# Patient Record
Sex: MALE | Race: OTHER | HISPANIC OR LATINO | URBAN - METROPOLITAN AREA
[De-identification: names, ages, dates, MRNs, and addresses within clinical notes are randomized per-mention and may not be internally consistent; named-entity substitution may affect disease eponyms.]

---

## 2017-08-20 ENCOUNTER — EMERGENCY (EMERGENCY)
Facility: HOSPITAL | Age: 22
LOS: 0 days | Discharge: HOME | End: 2017-08-20

## 2017-08-20 DIAGNOSIS — R50.9 FEVER, UNSPECIFIED: ICD-10-CM

## 2017-08-20 DIAGNOSIS — J02.9 ACUTE PHARYNGITIS, UNSPECIFIED: ICD-10-CM

## 2017-08-20 DIAGNOSIS — Z90.49 ACQUIRED ABSENCE OF OTHER SPECIFIED PARTS OF DIGESTIVE TRACT: ICD-10-CM

## 2017-09-29 ENCOUNTER — EMERGENCY (EMERGENCY)
Facility: HOSPITAL | Age: 22
LOS: 0 days | Discharge: HOME | End: 2017-09-29

## 2017-09-29 ENCOUNTER — EMERGENCY (EMERGENCY)
Facility: HOSPITAL | Age: 22
LOS: 0 days | Discharge: HOME | End: 2017-09-30

## 2017-09-29 DIAGNOSIS — Z90.49 ACQUIRED ABSENCE OF OTHER SPECIFIED PARTS OF DIGESTIVE TRACT: ICD-10-CM

## 2017-09-29 DIAGNOSIS — J02.9 ACUTE PHARYNGITIS, UNSPECIFIED: ICD-10-CM

## 2017-09-29 DIAGNOSIS — R50.9 FEVER, UNSPECIFIED: ICD-10-CM

## 2017-09-29 DIAGNOSIS — M79.1 MYALGIA: ICD-10-CM

## 2017-09-29 DIAGNOSIS — B34.9 VIRAL INFECTION, UNSPECIFIED: ICD-10-CM

## 2018-09-05 ENCOUNTER — EMERGENCY (EMERGENCY)
Facility: HOSPITAL | Age: 23
LOS: 1 days | Discharge: HOME | End: 2018-09-05
Attending: EMERGENCY MEDICINE | Admitting: EMERGENCY MEDICINE

## 2018-09-05 VITALS
SYSTOLIC BLOOD PRESSURE: 126 MMHG | RESPIRATION RATE: 20 BRPM | OXYGEN SATURATION: 99 % | TEMPERATURE: 103 F | WEIGHT: 184.97 LBS | DIASTOLIC BLOOD PRESSURE: 61 MMHG | HEIGHT: 70 IN | HEART RATE: 102 BPM

## 2018-09-05 VITALS
RESPIRATION RATE: 20 BRPM | SYSTOLIC BLOOD PRESSURE: 130 MMHG | HEART RATE: 67 BPM | DIASTOLIC BLOOD PRESSURE: 66 MMHG | TEMPERATURE: 98 F

## 2018-09-05 DIAGNOSIS — M79.1 MYALGIA: ICD-10-CM

## 2018-09-05 DIAGNOSIS — R50.9 FEVER, UNSPECIFIED: ICD-10-CM

## 2018-09-05 DIAGNOSIS — J02.9 ACUTE PHARYNGITIS, UNSPECIFIED: ICD-10-CM

## 2018-09-05 DIAGNOSIS — R59.0 LOCALIZED ENLARGED LYMPH NODES: ICD-10-CM

## 2018-09-05 RX ORDER — SODIUM CHLORIDE 9 MG/ML
1000 INJECTION INTRAMUSCULAR; INTRAVENOUS; SUBCUTANEOUS ONCE
Qty: 0 | Refills: 0 | Status: COMPLETED | OUTPATIENT
Start: 2018-09-05 | End: 2018-09-05

## 2018-09-05 RX ORDER — KETOROLAC TROMETHAMINE 30 MG/ML
15 SYRINGE (ML) INJECTION ONCE
Qty: 0 | Refills: 0 | Status: DISCONTINUED | OUTPATIENT
Start: 2018-09-05 | End: 2018-09-05

## 2018-09-05 RX ORDER — DEXAMETHASONE 0.5 MG/5ML
10 ELIXIR ORAL ONCE
Qty: 0 | Refills: 0 | Status: COMPLETED | OUTPATIENT
Start: 2018-09-05 | End: 2018-09-05

## 2018-09-05 RX ADMIN — Medication 102 MILLIGRAM(S): at 04:22

## 2018-09-05 RX ADMIN — SODIUM CHLORIDE 1000 MILLILITER(S): 9 INJECTION INTRAMUSCULAR; INTRAVENOUS; SUBCUTANEOUS at 04:22

## 2018-09-05 RX ADMIN — Medication 10 MILLIGRAM(S): at 04:22

## 2018-09-05 RX ADMIN — Medication 15 MILLIGRAM(S): at 04:22

## 2018-09-05 NOTE — ED ADULT NURSE NOTE - NSIMPLEMENTINTERV_GEN_ALL_ED
Implemented All Universal Safety Interventions:  [place] to call system. Call bell, personal items and telephone within reach. Instruct patient to call for assistance. Room bathroom lighting operational. Non-slip footwear when patient is off stretcher. Physically safe environment: no spills, clutter or unnecessary equipment. Stretcher in lowest position, wheels locked, appropriate side rails in place.

## 2018-09-05 NOTE — ED PROVIDER NOTE - MEDICAL DECISION MAKING DETAILS
24 yo male with fever and sore throat, already on abx by his ENT doctor.  Tolerating PO, feeling much better, d/c home in a stable condition.

## 2018-09-05 NOTE — ED PROVIDER NOTE - NS ED ROS FT
Review of Systems    Constitutional: (+) fever  Cardiovascular: (-) chest pain, (-) syncope  Respiratory: (-) cough, (-) shortness of breath  Gastrointestinal: (-) vomiting, (-) diarrhea  Integumentary: (-) rash, (-) edema  Neurological: (-) headache

## 2018-09-05 NOTE — ED ADULT NURSE NOTE - OBJECTIVE STATEMENT
Pt c/o fever and swelling of left side of neck. Pt states has frequent swelling and saw specialist. No s/s of distress, airway intact. Denies n/v/d.

## 2018-09-05 NOTE — ED PROVIDER NOTE - OBJECTIVE STATEMENT
22 y/o M with PMH chronic throat infection, saw ENT recently and told needs "glands" removed to prevent recurrence as he has had 1 episodes in last year of same symptoms: sore throat, fever, swollen lymph nodes. He relates this episode is x yesterday. +body aches. on amoxicillin. Denies CP, palpitations, SOB, back pain, abdominal pain, n/v/d, , trauma, fall, cough, recent travel,  sick contacts, leg pain/swelling, urinary symptoms, rash.

## 2018-09-05 NOTE — ED PROVIDER NOTE - CARE PLAN
Principal Discharge DX:	Lymphadenopathy  Secondary Diagnosis:	Fever  Secondary Diagnosis:	Pharyngitis

## 2018-09-05 NOTE — ED PROVIDER NOTE - PROGRESS NOTE DETAILS
counseled to continue amox and follow with ENT. Counseled on red flags and to return for them. Counseled on importance of follow up. Patient repeats back instructions  Patient appears well on discharge. Appears well, states he is feeling much better, feels like swelling has gone down.  instructed to follow up with his ENT doctor, strict return precautions given,  patient and his mom verbalized understanding and are amenable with the plan.

## 2018-09-05 NOTE — ED PROVIDER NOTE - ATTENDING CONTRIBUTION TO CARE
24 yo male with fever and sore throat since yeesterday similar to multiple prior episodes ( 11th time this year).  Started taking abx prescribed by his ENT doctor, took a dose of Tylenol a few hours PTA. Denies any voice changes, difficulties swallowing,, earache, cough,. SOB , abdominal pain,  Well-appearing, well-nourished young male, NAD, PERRL, mmm, nml phonation, + cervical YEIMI, mild pharyngeal erythema, supple neck, rest of exam WNL.  Will give a dose of antipyretics, decadron, IVF, reassess, anticipate d/c home with ENT f/u ( patient already had a discussion with his doctor regarding surgery, will likely get it scheduled for October).

## 2018-09-05 NOTE — ED PROVIDER NOTE - PHYSICAL EXAMINATION
PHYSICAL EXAM:    GENERAL: Alert, appears stated age, well appearing, non-toxic  SKIN: Warm, pink and dry. MMM.   EYE: Normal lids/conjunctiva  ENT: Normal hearing, patent oropharynx +erythema, without exudate. no uvular deviation. +L anterior cervical swelling with TTP, no skin changes. bedside sono confirms that this is lymph node and not abscess.   NECK: +supple. No meningismus  Pulm: Bilateral BS, normal resp effort, no wheezes, stridor, or retractions  CV: RRR, no M/R/G, 2+ pulses  Abd: soft, non-tender, non-distended, no hepatosplenomegaly. no CVA tenderness.   Mskel: no erythema, cyanosis, edema  Neuro: AAOx3. normal gait.

## 2018-09-21 ENCOUNTER — EMERGENCY (EMERGENCY)
Facility: HOSPITAL | Age: 23
LOS: 0 days | Discharge: HOME | End: 2018-09-21
Admitting: EMERGENCY MEDICAL TECHNICIAN, BASIC

## 2018-09-21 VITALS
RESPIRATION RATE: 18 BRPM | HEART RATE: 72 BPM | OXYGEN SATURATION: 100 % | TEMPERATURE: 99 F | SYSTOLIC BLOOD PRESSURE: 128 MMHG | DIASTOLIC BLOOD PRESSURE: 70 MMHG

## 2018-09-21 VITALS
TEMPERATURE: 101 F | HEART RATE: 82 BPM | DIASTOLIC BLOOD PRESSURE: 65 MMHG | SYSTOLIC BLOOD PRESSURE: 131 MMHG | OXYGEN SATURATION: 100 % | RESPIRATION RATE: 18 BRPM

## 2018-09-21 DIAGNOSIS — R07.0 PAIN IN THROAT: ICD-10-CM

## 2018-09-21 DIAGNOSIS — J02.9 ACUTE PHARYNGITIS, UNSPECIFIED: ICD-10-CM

## 2018-09-21 RX ORDER — DEXAMETHASONE 0.5 MG/5ML
10 ELIXIR ORAL ONCE
Qty: 0 | Refills: 0 | Status: COMPLETED | OUTPATIENT
Start: 2018-09-21 | End: 2018-09-21

## 2018-09-21 RX ORDER — IBUPROFEN 200 MG
800 TABLET ORAL ONCE
Qty: 0 | Refills: 0 | Status: DISCONTINUED | OUTPATIENT
Start: 2018-09-21 | End: 2018-09-21

## 2018-09-21 RX ORDER — ACETAMINOPHEN 500 MG
650 TABLET ORAL ONCE
Qty: 0 | Refills: 0 | Status: COMPLETED | OUTPATIENT
Start: 2018-09-21 | End: 2018-09-21

## 2018-09-21 RX ADMIN — Medication 650 MILLIGRAM(S): at 21:02

## 2018-09-21 RX ADMIN — Medication 10 MILLIGRAM(S): at 22:02

## 2018-09-21 NOTE — ED PROVIDER NOTE - OBJECTIVE STATEMENT
22 yo male, no significant PMH, presents to the ED for evaluation of sore throat that started today. Patient explains that for the passed 15 months he has had on and off URIs, most recent was one week prior to today's visit; patient finished amoxicillin course 3 days ago. Patient also states that he is following an ENT who recently performed an endoscopy finding abnormalities of the patient's tonsils, and has scheduled patient for tonsillectomy in two months. Motrin was taken PTA, which had minimal relief of patient's symptoms. Additionally, patient reports fever, chills, and malaise; denies HA, vision changes, ear pain, neck pain, cough, SOB, dyspnea, chest pain, N/V/D, abdominal pain, dysuria, dizziness, numbness, and tingling.

## 2018-09-21 NOTE — ED ADULT NURSE NOTE - OBJECTIVE STATEMENT
23 Y M presented with throat pain. respirations easy and unlabored no swelling or difficulty swallowing. Pt is speaking clean and full sentences with no sob

## 2018-09-21 NOTE — ED PROVIDER NOTE - PHYSICAL EXAMINATION
Physical Exam    Vital Signs: I have reviewed the initial vital signs.  Constitutional: well-nourished, appears stated age, no acute distress  Eyes: Conjunctiva pink, Sclera clear, PERRLA, EOMI.  ENT: OP erythematous with exudates on left tonsil, MMMs, normal dentition, normal gingival, normal tongue, TMs clear b/l, b/l turbinates without erythema, positive b/l tonsilar lymphadenopathy.  Cardiovascular: S1 and S2, regular rate, regular rhythm, well-perfused extremities, radial pulses equal and 2+  Respiratory: unlabored respiratory effort, clear to auscultation bilaterally no wheezing, rales and rhonchi  Gastrointestinal: soft, non-tender abdomen, no pulsatile mass, normal bowl sounds  Musculoskeletal: supple neck, no lower extremity edema, no midline tenderness.  Integumentary: warm, dry, no rash  Neurologic: awake, alert, cranial nerves II-XII grossly intact, extremities’ motor and sensory functions grossly intact

## 2018-09-21 NOTE — ED PROVIDER NOTE - NS ED ROS FT
Constitutional: (+) fever, (+) chills,   Eyes/ENT: (-) blurry vision, (-) epistaxis, (+) sore throat, (-) ear pain  Cardiovascular: (-) chest pain, (-) syncope  Respiratory: (-) cough, (-) shortness of breath  Gastrointestinal: (-) vomiting, (-) diarrhea, (-) nausea   Musculoskeletal: (-) neck pain, (-) back pain, (-) joint pain  Integumentary: (-) rash, (-) edema  Neurological: (-) headache, (-) altered mental status, (-) dizziness, (-) tingling, (-) numbness

## 2018-09-21 NOTE — ED ADULT NURSE NOTE - NSIMPLEMENTINTERV_GEN_ALL_ED
Implemented All Universal Safety Interventions:  [place] to call system. Call bell, personal items and telephone within reach. Instruct patient to call for assistance. Room bathroom lighting operational. Non-slip footwear when patient is off stretcher. Physically safe environment: no spills, clutter or unnecessary equipment. Stretcher in lowest position, wheels locked, appropriate side rails in place.

## 2018-09-22 PROBLEM — R59.9 ENLARGED LYMPH NODES, UNSPECIFIED: Chronic | Status: ACTIVE | Noted: 2018-09-05

## 2018-09-22 PROBLEM — K37 UNSPECIFIED APPENDICITIS: Chronic | Status: ACTIVE | Noted: 2018-09-05

## 2018-10-07 ENCOUNTER — EMERGENCY (EMERGENCY)
Facility: HOSPITAL | Age: 23
LOS: 1 days | Discharge: HOME | End: 2018-10-07
Attending: EMERGENCY MEDICINE | Admitting: EMERGENCY MEDICINE

## 2018-10-07 DIAGNOSIS — J02.9 ACUTE PHARYNGITIS, UNSPECIFIED: ICD-10-CM

## 2018-10-07 DIAGNOSIS — R07.0 PAIN IN THROAT: ICD-10-CM

## 2018-10-09 PROBLEM — Z00.00 ENCOUNTER FOR PREVENTIVE HEALTH EXAMINATION: Status: ACTIVE | Noted: 2018-10-09

## 2018-11-14 ENCOUNTER — APPOINTMENT (OUTPATIENT)
Dept: OTOLARYNGOLOGY | Facility: CLINIC | Age: 23
End: 2018-11-14

## 2019-12-07 ENCOUNTER — OUTPATIENT (OUTPATIENT)
Dept: OUTPATIENT SERVICES | Facility: HOSPITAL | Age: 24
LOS: 1 days | Discharge: HOME | End: 2019-12-07
Payer: COMMERCIAL

## 2019-12-07 DIAGNOSIS — R59.9 ENLARGED LYMPH NODES, UNSPECIFIED: ICD-10-CM

## 2019-12-07 PROCEDURE — 70492 CT SFT TSUE NCK W/O & W/DYE: CPT | Mod: 26

## 2019-12-08 ENCOUNTER — EMERGENCY (EMERGENCY)
Facility: HOSPITAL | Age: 24
LOS: 0 days | Discharge: HOME | End: 2019-12-08
Attending: EMERGENCY MEDICINE | Admitting: EMERGENCY MEDICINE
Payer: COMMERCIAL

## 2019-12-08 VITALS
TEMPERATURE: 101 F | SYSTOLIC BLOOD PRESSURE: 135 MMHG | DIASTOLIC BLOOD PRESSURE: 69 MMHG | HEART RATE: 100 BPM | OXYGEN SATURATION: 99 % | RESPIRATION RATE: 20 BRPM

## 2019-12-08 VITALS — SYSTOLIC BLOOD PRESSURE: 117 MMHG | HEART RATE: 68 BPM | TEMPERATURE: 99 F | DIASTOLIC BLOOD PRESSURE: 58 MMHG

## 2019-12-08 DIAGNOSIS — R06.02 SHORTNESS OF BREATH: ICD-10-CM

## 2019-12-08 DIAGNOSIS — Z90.89 ACQUIRED ABSENCE OF OTHER ORGANS: Chronic | ICD-10-CM

## 2019-12-08 DIAGNOSIS — J06.9 ACUTE UPPER RESPIRATORY INFECTION, UNSPECIFIED: ICD-10-CM

## 2019-12-08 DIAGNOSIS — M54.2 CERVICALGIA: ICD-10-CM

## 2019-12-08 LAB
ALBUMIN SERPL ELPH-MCNC: 4.5 G/DL — SIGNIFICANT CHANGE UP (ref 3.5–5.2)
ALP SERPL-CCNC: 58 U/L — SIGNIFICANT CHANGE UP (ref 30–115)
ALT FLD-CCNC: 20 U/L — SIGNIFICANT CHANGE UP (ref 0–41)
ANION GAP SERPL CALC-SCNC: 15 MMOL/L — HIGH (ref 7–14)
APPEARANCE UR: CLEAR — SIGNIFICANT CHANGE UP
AST SERPL-CCNC: 27 U/L — SIGNIFICANT CHANGE UP (ref 0–41)
BASOPHILS # BLD AUTO: 0.02 K/UL — SIGNIFICANT CHANGE UP (ref 0–0.2)
BASOPHILS NFR BLD AUTO: 0.2 % — SIGNIFICANT CHANGE UP (ref 0–1)
BILIRUB SERPL-MCNC: 0.6 MG/DL — SIGNIFICANT CHANGE UP (ref 0.2–1.2)
BILIRUB UR-MCNC: NEGATIVE — SIGNIFICANT CHANGE UP
BUN SERPL-MCNC: 9 MG/DL — LOW (ref 10–20)
CALCIUM SERPL-MCNC: 9.1 MG/DL — SIGNIFICANT CHANGE UP (ref 8.5–10.1)
CHLORIDE SERPL-SCNC: 100 MMOL/L — SIGNIFICANT CHANGE UP (ref 98–110)
CO2 SERPL-SCNC: 22 MMOL/L — SIGNIFICANT CHANGE UP (ref 17–32)
COLOR SPEC: YELLOW — SIGNIFICANT CHANGE UP
CREAT SERPL-MCNC: 1 MG/DL — SIGNIFICANT CHANGE UP (ref 0.7–1.5)
DIFF PNL FLD: NEGATIVE — SIGNIFICANT CHANGE UP
EOSINOPHIL # BLD AUTO: 0 K/UL — SIGNIFICANT CHANGE UP (ref 0–0.7)
EOSINOPHIL NFR BLD AUTO: 0 % — SIGNIFICANT CHANGE UP (ref 0–8)
GLUCOSE SERPL-MCNC: 105 MG/DL — HIGH (ref 70–99)
GLUCOSE UR QL: NEGATIVE — SIGNIFICANT CHANGE UP
HCT VFR BLD CALC: 44.1 % — SIGNIFICANT CHANGE UP (ref 42–52)
HGB BLD-MCNC: 14.2 G/DL — SIGNIFICANT CHANGE UP (ref 14–18)
IMM GRANULOCYTES NFR BLD AUTO: 0.4 % — HIGH (ref 0.1–0.3)
KETONES UR-MCNC: ABNORMAL
LACTATE SERPL-SCNC: 0.9 MMOL/L — SIGNIFICANT CHANGE UP (ref 0.7–2)
LEUKOCYTE ESTERASE UR-ACNC: NEGATIVE — SIGNIFICANT CHANGE UP
LYMPHOCYTES # BLD AUTO: 0.51 K/UL — LOW (ref 1.2–3.4)
LYMPHOCYTES # BLD AUTO: 5.3 % — LOW (ref 20.5–51.1)
MCHC RBC-ENTMCNC: 26.5 PG — LOW (ref 27–31)
MCHC RBC-ENTMCNC: 32.2 G/DL — SIGNIFICANT CHANGE UP (ref 32–37)
MCV RBC AUTO: 82.3 FL — SIGNIFICANT CHANGE UP (ref 80–94)
MONOCYTES # BLD AUTO: 0.84 K/UL — HIGH (ref 0.1–0.6)
MONOCYTES NFR BLD AUTO: 8.7 % — SIGNIFICANT CHANGE UP (ref 1.7–9.3)
NEUTROPHILS # BLD AUTO: 8.26 K/UL — HIGH (ref 1.4–6.5)
NEUTROPHILS NFR BLD AUTO: 85.4 % — HIGH (ref 42.2–75.2)
NITRITE UR-MCNC: NEGATIVE — SIGNIFICANT CHANGE UP
NRBC # BLD: 0 /100 WBCS — SIGNIFICANT CHANGE UP (ref 0–0)
PH UR: 7.5 — SIGNIFICANT CHANGE UP (ref 5–8)
PLATELET # BLD AUTO: 178 K/UL — SIGNIFICANT CHANGE UP (ref 130–400)
POTASSIUM SERPL-MCNC: 4 MMOL/L — SIGNIFICANT CHANGE UP (ref 3.5–5)
POTASSIUM SERPL-SCNC: 4 MMOL/L — SIGNIFICANT CHANGE UP (ref 3.5–5)
PROT SERPL-MCNC: 7.1 G/DL — SIGNIFICANT CHANGE UP (ref 6–8)
PROT UR-MCNC: ABNORMAL
RBC # BLD: 5.36 M/UL — SIGNIFICANT CHANGE UP (ref 4.7–6.1)
RBC # FLD: 12.4 % — SIGNIFICANT CHANGE UP (ref 11.5–14.5)
SODIUM SERPL-SCNC: 137 MMOL/L — SIGNIFICANT CHANGE UP (ref 135–146)
SP GR SPEC: 1.01 — SIGNIFICANT CHANGE UP (ref 1.01–1.02)
UROBILINOGEN FLD QL: SIGNIFICANT CHANGE UP
WBC # BLD: 9.67 K/UL — SIGNIFICANT CHANGE UP (ref 4.8–10.8)
WBC # FLD AUTO: 9.67 K/UL — SIGNIFICANT CHANGE UP (ref 4.8–10.8)

## 2019-12-08 PROCEDURE — 99284 EMERGENCY DEPT VISIT MOD MDM: CPT

## 2019-12-08 PROCEDURE — 71046 X-RAY EXAM CHEST 2 VIEWS: CPT | Mod: 26

## 2019-12-08 RX ORDER — IBUPROFEN 200 MG
600 TABLET ORAL ONCE
Refills: 0 | Status: COMPLETED | OUTPATIENT
Start: 2019-12-08 | End: 2019-12-08

## 2019-12-08 RX ORDER — DEXAMETHASONE 0.5 MG/5ML
10 ELIXIR ORAL ONCE
Refills: 0 | Status: COMPLETED | OUTPATIENT
Start: 2019-12-08 | End: 2019-12-08

## 2019-12-08 RX ORDER — SODIUM CHLORIDE 9 MG/ML
1000 INJECTION INTRAMUSCULAR; INTRAVENOUS; SUBCUTANEOUS ONCE
Refills: 0 | Status: COMPLETED | OUTPATIENT
Start: 2019-12-08 | End: 2019-12-08

## 2019-12-08 RX ADMIN — SODIUM CHLORIDE 1000 MILLILITER(S): 9 INJECTION INTRAMUSCULAR; INTRAVENOUS; SUBCUTANEOUS at 16:01

## 2019-12-08 RX ADMIN — Medication 10 MILLIGRAM(S): at 16:10

## 2019-12-08 RX ADMIN — Medication 600 MILLIGRAM(S): at 16:10

## 2019-12-08 NOTE — ED PROVIDER NOTE - NS ED ROS FT
Constitutional: + fever/chills, body aches. no recent weight loss, change in appetite or malaise  Eyes: no redness/discharge/pain/vision changes  ENT: + rhinorrhea, sore throat, sinus congestion  Cardiac: No chest pain, SOB or edema.  Respiratory: No cough or respiratory distress  GI: No nausea, vomiting, diarrhea or abdominal pain.  : No dysuria, frequency, urgency or hematuria  MS: no pain to back or extremities, no loss of ROM, no weakness  Neuro: No headache or weakness. No LOC.  Skin: No skin rash.  Endocrine: No history of thyroid disease or diabetes.  Except as documented in the HPI, all other systems are negative.

## 2019-12-08 NOTE — ED PROVIDER NOTE - CLINICAL SUMMARY MEDICAL DECISION MAKING FREE TEXT BOX
I personally evaluated the patient. I reviewed the Resident’s or Physician Assistant’s note (as assigned above), and agree with the findings and plan except as documented in my note. patient evaluated for uri with fever and chills, patient started on antibiotics earlier today, patient found to have pharyngeal erythema, much improved post steroids. I have fully discussed the medical management and delivery of care with the patient. I have discussed any available labs, imaging and treatment options with the patient. Patient confirms understanding and has been given detailed return precautions. Patient instructed to return to the ED should symptoms persist or worsen. Patient has demonstrated capacity and has verbalized understanding. Patient is well appearing upon discharge.

## 2019-12-08 NOTE — ED PROVIDER NOTE - CARE PROVIDER_API CALL
Rome Mac (DO)  Infectious Disease; Internal Medicine  [address] [place] [address]  Phone: [phone]  Fax: [phone]  Follow Up Time:

## 2019-12-08 NOTE — ED PROVIDER NOTE - PATIENT PORTAL LINK FT
You can access the FollowMyHealth Patient Portal offered by [facility] by registering at the following website: [email or web address]. By joining [employer]’s FollowMyHealth portal, you will also be able to view your health information using other applications (apps) compatible with our system.

## 2019-12-08 NOTE — ED PROVIDER NOTE - ATTENDING CONTRIBUTION TO CARE
24 year old male, no sig pmhx, come sin with cough, dry non productive, for approx 3-4 days, associated with throat pain, no cp/sob, no n/v/d, no loc, no hemoptysis, no recent travel. [facility] and was started on clinda, first dose this morning    CONSTITUTIONAL: Well-developed; well-nourished; in no acute distress. Sitting up and providing appropriate history and physical examination  SKIN: skin exam is warm and dry, no acute rash.  HEAD: Normocephalic; atraumatic.  EYES: PERRL, 3 mm bilateral, no nystagmus, EOM intact; conjunctiva and sclera clear.  ENT: + Impressive pharyngeal erythema, + cobblestoning, no exudate, no evidence of pta, no edema, + post nasal drip.  NECK: Supple; non tender. + full passive ROM in all directions. No JVD  CARD: S1, S2 normal; no murmurs, gallops, or rubs. Regular rate and rhythm. + Symmetric Strong Pulses  RESP: No wheezes, rales or rhonchi. Good air movement bilaterally  ABD: soft; non-distended; non-tender. No Rebound, No Guarding, No signs of peritonitis, No CVA tenderness. No pulsatile abdominal mass. + Strong and Symmetric Pulses  EXT: Normal ROM. No clubbing, cyanosis or edema. Dp and Pt Pulses intact. Cap refill less than 3 seconds  NEURO: CN 2-12 intact, normal finger to nose, normal romberg, stable gait, no sensory or motor deficits, Alert, oriented, grossly unremarkable. No Focal deficits. GCS 15. NIH 0  PSYCH: Cooperative, appropriate.

## 2019-12-08 NOTE — ED ADULT NURSE REASSESSMENT NOTE - NS ED NURSE REASSESS COMMENT FT1
pt. came to ED c'o fever, body aches and right sided neck swelling. pt. airway intact no respiratory distress noted. Labs drawn and sent, pt. medicated as ordered. IVF's started. Comfort and safety measures in place. will monitor.

## 2019-12-08 NOTE — ED ADULT NURSE NOTE - NSIMPLEMENTINTERV_GEN_ALL_ED
Implemented All Universal Safety Interventions:  [place] to call system. Call bell, personal items and telephone within reach. Instruct patient to call for assistance. Room bathroom lighting operational. Non-slip footwear when patient is off stretcher. Physically safe environment: no spills, clutter or unnecessary equipment. Stretcher in lowest position, wheels locked, appropriate side rails in place.

## 2019-12-08 NOTE — ED PROVIDER NOTE - PHYSICAL EXAMINATION
CONSTITUTIONAL: Well-appearing; well-nourished; in no apparent distress.   EYES: PERRL; EOM intact.   ENT: + mildly erythematous pharynx. No exudates. No tonsillar hypertrophy. Uvula midline  NECK: + rt sided tender cervical LAD  CARDIOVASCULAR: Normal S1, S2; no murmurs, rubs, or gallops.   RESPIRATORY: Normal chest excursion with respiration; breath sounds clear and equal bilaterally; no wheezes, rhonchi, or rales.  GI/: Normal bowel sounds; non-distended; non-tender; no palpable organomegaly.   MS: No evidence of trauma or deformity. Normal ROM in all four extremities; non-tender to palpation; distal pulses are normal.   SKIN: Normal for age and race; warm; dry; good turgor; no apparent lesions or exudate.   NEURO/PSYCH: A & O x 4; grossly unremarkable. mood and manner are appropriate. Grooming and personal hygiene are appropriate. No apparent thoughts of harm to self or others.

## 2022-03-21 ENCOUNTER — TRANSCRIPTION ENCOUNTER (OUTPATIENT)
Age: 27
End: 2022-03-21

## 2022-06-14 NOTE — ED PROVIDER NOTE - NS ED MD EM SELECTION
Per mother, kitchen chair fell on patients big toe on left foot. Wf x-ray results   [id number] Comprehensive

## 2023-12-08 NOTE — ED ADULT NURSE NOTE - NSFALLRSKASSESSDT_ED_ALL_ED
Otc Regimen: OTC 1.87% Ivermectin cream twice daily when flaring and once daily when not flared to help with acne bumps Detail Level: Zone Plan: Avoid spicy food, alcohol, and other triggers to help with redness Otc Regimen: Proactive 05-Sep-2018 04:24

## 2024-06-04 NOTE — ED ADULT NURSE NOTE - NSSISCREENINGQ1_ED_A_ED
Name And Contact Information For Health Care Proxy: Denice Abdi wife [phone] Detail Level: Detailed Quality 226: Preventive Care And Screening: Tobacco Use: Screening And Cessation Intervention: Patient screened for tobacco use and is an ex/non-smoker Quality 47: Advance Care Plan: Advance Care Planning discussed and documented; advance care plan or surrogate decision maker documented in the medical record. No

## 2024-11-20 NOTE — ED ADULT TRIAGE NOTE - CADM TRG TX PRIOR TO ARRIVAL
Pt is calling to get a refill on his     Synjardy 12.5-500 mg #180  pt takes 2 pills a day    [facility]   [phone]     medications/amoxicllin and tylenol